# Patient Record
Sex: MALE | Race: OTHER | Employment: FULL TIME | URBAN - METROPOLITAN AREA
[De-identification: names, ages, dates, MRNs, and addresses within clinical notes are randomized per-mention and may not be internally consistent; named-entity substitution may affect disease eponyms.]

---

## 2020-11-30 ENCOUNTER — HOSPITAL ENCOUNTER (OUTPATIENT)
Age: 20
Discharge: HOME | End: 2020-11-30
Payer: COMMERCIAL

## 2020-11-30 VITALS
HEART RATE: 109 BPM | BODY MASS INDEX: 30.65 KG/M2 | HEIGHT: 63 IN | RESPIRATION RATE: 20 BRPM | WEIGHT: 173 LBS | DIASTOLIC BLOOD PRESSURE: 87 MMHG | SYSTOLIC BLOOD PRESSURE: 136 MMHG | TEMPERATURE: 99 F | OXYGEN SATURATION: 98 %

## 2020-11-30 DIAGNOSIS — Z20.822 ENCOUNTER FOR LABORATORY TESTING FOR COVID-19 VIRUS: ICD-10-CM

## 2020-11-30 DIAGNOSIS — Z20.822 SUSPECTED COVID-19 VIRUS INFECTION: Primary | ICD-10-CM

## 2020-11-30 DIAGNOSIS — Z20.822 CLOSE EXPOSURE TO COVID-19 VIRUS: ICD-10-CM

## 2020-11-30 PROCEDURE — 99202 OFFICE O/P NEW SF 15 MIN: CPT | Performed by: NURSE PRACTITIONER

## 2020-11-30 NOTE — ED INITIAL ASSESSMENT (HPI)
Pt requesting covid test.   Pt c/o low back pain x4 days. No urinary symptoms. No falls or injury. States dad tested positive for covid last week. No fever. No cold/cough. Awake/alert. Breathing easy and even without distress. Speech clear.  Skin warm

## 2020-11-30 NOTE — ED PROVIDER NOTES
Patient Seen in: [facility]    History   CC: covid testing  HPI: Brisa Shah 23year old male  who presents requesting Covid testing. States he has had 4 days mild myalgias. No other symptoms or complaints.   Lives with father who enlargement or exudate, uvula midline, +gag, voice is clear  Neck - no significant adenopathy, supple with trachea midline  Resp - Lung sounds clear bilaterally and wob unlabored, good aeration with equal, even expansion bilaterally   CV - RRR  Skin - no r

## 2022-01-19 ENCOUNTER — OFFICE VISIT (OUTPATIENT)
Dept: FAMILY MEDICINE CLINIC | Facility: CLINIC | Age: 22
End: 2022-01-19
Payer: COMMERCIAL

## 2022-01-19 ENCOUNTER — LAB ENCOUNTER (OUTPATIENT)
Dept: LAB | Age: 22
End: 2022-01-19
Attending: FAMILY MEDICINE
Payer: COMMERCIAL

## 2022-01-19 VITALS
HEIGHT: 65 IN | WEIGHT: 174 LBS | HEART RATE: 84 BPM | DIASTOLIC BLOOD PRESSURE: 69 MMHG | BODY MASS INDEX: 28.99 KG/M2 | SYSTOLIC BLOOD PRESSURE: 111 MMHG

## 2022-01-19 DIAGNOSIS — R51.9 ACUTE NONINTRACTABLE HEADACHE, UNSPECIFIED HEADACHE TYPE: Primary | ICD-10-CM

## 2022-01-19 DIAGNOSIS — R51.9 ACUTE NONINTRACTABLE HEADACHE, UNSPECIFIED HEADACHE TYPE: ICD-10-CM

## 2022-01-19 PROCEDURE — 99213 OFFICE O/P EST LOW 20 MIN: CPT | Performed by: FAMILY MEDICINE

## 2022-01-19 PROCEDURE — 3074F SYST BP LT 130 MM HG: CPT | Performed by: FAMILY MEDICINE

## 2022-01-19 PROCEDURE — 3078F DIAST BP <80 MM HG: CPT | Performed by: FAMILY MEDICINE

## 2022-01-19 PROCEDURE — 3008F BODY MASS INDEX DOCD: CPT | Performed by: FAMILY MEDICINE

## 2022-01-19 RX ORDER — IBUPROFEN 600 MG/1
600 TABLET ORAL EVERY 6 HOURS PRN
Qty: 60 TABLET | Refills: 0 | Status: SHIPPED | OUTPATIENT
Start: 2022-01-19

## 2022-01-19 NOTE — PROGRESS NOTES
1/19/2022  11:58 AM    Patrick Patel is a 19year old male.     Chief complaint(s): Patient presents with:  Headache: started on sunday   Fatigue: started on sunday   Nausea: started on sunday     HPI:     Zurdo Gardner primary complai Neurological: Positive for headaches. Negative for dizziness and weakness. PHYSICAL EXAM:   VS: /69   Pulse 84   Ht 5' 5\" (1.651 m)   Wt 174 lb (78.9 kg)   BMI 28.96 kg/m²     Physical Exam  Vitals reviewed.    Constitutional:       [facility] FOLLOW-UP: Schedule a follow-up visit in  prn.          Orders This Visit:  Orders Placed This Encounter      COVID-19 Testing by PCR [facility]) [E]      Meds This Visit:  Requested Prescriptions     Signed Prescriptions Disp Refills   • ibuprofen 600 MG

## 2022-01-21 LAB — SARS-COV-2 RNA RESP QL NAA+PROBE: NOT DETECTED
